# Patient Record
Sex: MALE | Race: WHITE | NOT HISPANIC OR LATINO | Employment: OTHER | URBAN - METROPOLITAN AREA
[De-identification: names, ages, dates, MRNs, and addresses within clinical notes are randomized per-mention and may not be internally consistent; named-entity substitution may affect disease eponyms.]

---

## 2017-02-13 ENCOUNTER — ALLSCRIPTS OFFICE VISIT (OUTPATIENT)
Dept: OTHER | Facility: OTHER | Age: 67
End: 2017-02-13

## 2017-06-09 ENCOUNTER — ALLSCRIPTS OFFICE VISIT (OUTPATIENT)
Dept: OTHER | Facility: OTHER | Age: 67
End: 2017-06-09

## 2017-06-22 ENCOUNTER — GENERIC CONVERSION - ENCOUNTER (OUTPATIENT)
Dept: OTHER | Facility: OTHER | Age: 67
End: 2017-06-22

## 2017-06-22 LAB
A/G RATIO (HISTORICAL): 2.1 (ref 1.2–2.2)
ALBUMIN SERPL BCP-MCNC: 4.4 G/DL (ref 3.6–4.8)
ALP SERPL-CCNC: 69 IU/L (ref 39–117)
ALT SERPL W P-5'-P-CCNC: 14 IU/L (ref 0–44)
AST SERPL W P-5'-P-CCNC: 17 IU/L (ref 0–40)
BILIRUB SERPL-MCNC: 0.3 MG/DL (ref 0–1.2)
BUN SERPL-MCNC: 15 MG/DL (ref 8–27)
BUN/CREA RATIO (HISTORICAL): 18 (ref 10–24)
CALCIUM SERPL-MCNC: 10.3 MG/DL (ref 8.6–10.2)
CHLORIDE SERPL-SCNC: 103 MMOL/L (ref 96–106)
CHOLEST SERPL-MCNC: 182 MG/DL (ref 100–199)
CHOLEST/HDLC SERPL: 2 RATIO UNITS (ref 0–5)
CO2 SERPL-SCNC: 24 MMOL/L (ref 18–29)
CREAT SERPL-MCNC: 0.82 MG/DL (ref 0.76–1.27)
EGFR AFRICAN AMERICAN (HISTORICAL): 106 ML/MIN/1.73
EGFR-AMERICAN CALC (HISTORICAL): 92 ML/MIN/1.73
GLUCOSE SERPL-MCNC: 106 MG/DL (ref 65–99)
HDLC SERPL-MCNC: 90 MG/DL
INTERPRETATION (HISTORICAL): NORMAL
LDLC SERPL CALC-MCNC: 79 MG/DL (ref 0–99)
POTASSIUM SERPL-SCNC: 5.5 MMOL/L (ref 3.5–5.2)
PROSTATE SPECIFIC ANTIGEN (HISTORICAL): 3.2 NG/ML (ref 0–4)
SODIUM SERPL-SCNC: 141 MMOL/L (ref 134–144)
TOT. GLOBULIN, SERUM (HISTORICAL): 2.1 G/DL (ref 1.5–4.5)
TOTAL PROTEIN (HISTORICAL): 6.5 G/DL (ref 6–8.5)
TRIGL SERPL-MCNC: 64 MG/DL (ref 0–149)
VLDLC SERPL CALC-MCNC: 13 MG/DL (ref 5–40)

## 2017-06-23 ENCOUNTER — GENERIC CONVERSION - ENCOUNTER (OUTPATIENT)
Dept: OTHER | Facility: OTHER | Age: 67
End: 2017-06-23

## 2017-07-14 ENCOUNTER — ALLSCRIPTS OFFICE VISIT (OUTPATIENT)
Dept: OTHER | Facility: OTHER | Age: 67
End: 2017-07-14

## 2018-01-05 ENCOUNTER — GENERIC CONVERSION - ENCOUNTER (OUTPATIENT)
Dept: OTHER | Facility: OTHER | Age: 68
End: 2018-01-05

## 2018-01-05 LAB
PROSTATE SPECIFIC ANTIGEN (HISTORICAL): 3.1 NG/ML (ref 0–4)
PROSTATE SPECIFIC ANTIGEN FREE (HISTORICAL): 0.6 NG/ML
PROSTATE SPECIFIC ANTIGEN PERCENT FREE (HISTORICAL): 19.4 %

## 2018-01-08 ENCOUNTER — GENERIC CONVERSION - ENCOUNTER (OUTPATIENT)
Dept: OTHER | Facility: OTHER | Age: 68
End: 2018-01-08

## 2018-01-13 VITALS
BODY MASS INDEX: 25.61 KG/M2 | HEIGHT: 66 IN | WEIGHT: 159.38 LBS | HEART RATE: 90 BPM | DIASTOLIC BLOOD PRESSURE: 88 MMHG | RESPIRATION RATE: 18 BRPM | SYSTOLIC BLOOD PRESSURE: 162 MMHG | TEMPERATURE: 98.2 F

## 2018-01-13 VITALS
BODY MASS INDEX: 24.75 KG/M2 | WEIGHT: 154 LBS | HEART RATE: 100 BPM | DIASTOLIC BLOOD PRESSURE: 86 MMHG | HEIGHT: 66 IN | RESPIRATION RATE: 20 BRPM | SYSTOLIC BLOOD PRESSURE: 138 MMHG | TEMPERATURE: 98 F

## 2018-01-15 VITALS
HEIGHT: 66 IN | HEART RATE: 88 BPM | DIASTOLIC BLOOD PRESSURE: 82 MMHG | TEMPERATURE: 96.5 F | SYSTOLIC BLOOD PRESSURE: 140 MMHG | WEIGHT: 161 LBS | BODY MASS INDEX: 25.88 KG/M2 | RESPIRATION RATE: 20 BRPM

## 2018-01-16 ENCOUNTER — ALLSCRIPTS OFFICE VISIT (OUTPATIENT)
Dept: OTHER | Facility: OTHER | Age: 68
End: 2018-01-16

## 2018-01-16 ENCOUNTER — GENERIC CONVERSION - ENCOUNTER (OUTPATIENT)
Dept: OTHER | Facility: OTHER | Age: 68
End: 2018-01-16

## 2018-01-16 NOTE — RESULT NOTES
Discussion/Summary   PSA borderline  No comparisons  Repeat 6 months  Low carb diet, Hydrate well  Repeat PSA in 6 months     Verified Results  (1) COMPREHENSIVE METABOLIC PANEL [id number] 01:25CO Delmonico, Jodeen Frankel     Test Name Result Flag Reference   Glucose, Serum 106 mg/dL H 65-99   BUN 15 mg/dL  8-27   Creatinine, Serum 0 82 mg/dL  0 76-1 27   BUN/Creatinine Ratio 18  10-24   Sodium, Serum 141 mmol/L  134-144   Potassium, Serum 5 5 mmol/L H 3 5-5 2   Chloride, Serum 103 mmol/L     Carbon Dioxide, Total 24 mmol/L  18-29   Calcium, Serum 10 3 mg/dL H 8 6-10 2   Protein, Total, Serum 6 5 g/dL  6 0-8 5   Albumin, Serum 4 4 g/dL  3 6-4 8   Globulin, Total 2 1 g/dL  1 5-4 5   A/G Ratio 2 1  1 2-2 2   Bilirubin, Total 0 3 mg/dL  0 0-1 2   Alkaline Phosphatase, S 69 IU/L     AST (SGOT) 17 IU/L  0-40   ALT (SGPT) 14 IU/L  0-44   eGFR If NonAfricn Am 92 mL/min/1 73  >59   eGFR If Africn Am 106 mL/min/1 73  >59     (1) LIPID PANEL, FASTING 22Jun2017 08:47AM Austin Hunter Frankel     Test Name Result Flag Reference   Cholesterol, Total 182 mg/dL  100-199   Triglycerides 64 mg/dL  0-149   HDL Cholesterol 90 mg/dL  >39   VLDL Cholesterol Ivan 13 mg/dL  5-40   LDL Cholesterol Calc 79 mg/dL  0-99   T  Chol/HDL Ratio 2 0 ratio units  0 0-5 0   T  Chol/HDL Ratio                                                             Men  Women                                               1/2 Avg  Risk  3 4    3 3                                                   Avg Risk  5 0    4 4                                                2X Avg  Risk  9 6    7 1                                                3X Avg  Risk 23 4   11 0     (1) PSA (SCREEN) (Dx V76 44 Screen for Prostate Cancer) [id number] 08:47AM Austin Hunter Frankel     Test Name Result Flag Reference   Prostate Specific Ag, Serum 3 2 ng/mL  0 0-4 0   Roche ECLIA methodology    According to the American Urological Association, Serum PSA should  decrease and remain at undetectable levels after radical  prostatectomy  The AUA defines biochemical recurrence as an initial  PSA value 0 2 ng/mL or greater followed by a subsequent confirmatory  PSA value 0 2 ng/mL or greater  Values obtained with different assay methods or kits cannot be used  interchangeably  Results cannot be interpreted as absolute evidence  of the presence or absence of malignant disease  [facility]) Cardiovascular Risk Assessment [id number] 08:47AM Gopal Hunter     Test Name Result Flag Reference   Interpretation Note     Supplement report is available  PDF Image

## 2018-01-17 NOTE — PROGRESS NOTES
Assessment   1  Benign hypertension (401 1) (I10)  2  Nicotine abuse (305 1) (Z72 0)  3  Plantar warts (078 12) (B07 0)    Plan   Benign hypertension, Encounter for lipid screening for cardiovascular disease,    Encounter for prostate cancer screening    · (1) COMPREHENSIVE METABOLIC PANEL; Status:Active; Requested for:85Hty2191;    · (1) LIPID PANEL, FASTING; Status:Active; Requested for:89Qga9097;    · (1) PSA FREE & TOTAL; Status:Active; Requested for:01Rhg9818; Benign hypertension, Encounter for prostate cancer screening, Organic impotence    · (1) PSA FREE & TOTAL; Status:Canceled;   Encounter for screening colonoscopy    · COLONOSCOPY; Status:Active; Requested ARE:[id number]; Health Maintenance    · Follow-up Visit in 7 Months Evaluation and Treatment  Follow-up  Status: Hold For -    Scheduling  Requested for: 67JUF6208  Organic impotence    · Start: Sildenafil Citrate 20 MG Oral Tablet; 2 daily as needed    Discussion/Summary      F/u labs PE  Colonoscopy ordered  low salt diet  Smoking cessations  wart patch daily otc eval if persists  The patient was counseled regarding  Chief Complaint   Pt here for b/w review and medication follow up  sp/cma    Patient is here today for follow up of chronic conditions described in HPI  Advance Directives   Advance Directive [facility]:    5 wishes given  History of Present Illness   f/u HTN labs PSA  Needs colon screening  Cough chronic smoker   Pain L heel      Review of Systems        Constitutional: no fever-- and-- no chills  Eyes: no eyesight problems-- and-- no purulent discharge from the eyes  ENT: no nosebleeds-- and-- no nasal discharge  Cardiovascular: the heart rate was not slow,-- no chest pain,-- the heart rate was not fast-- and-- no palpitations  Respiratory: cough, but-- no shortness of breath-- and-- no shortness of breath during exertion  Gastrointestinal: no nausea-- and-- no diarrhea        Genitourinary: no urinary hesitancy-- and-- no nocturia  Integumentary: no itching-- and-- no skin wound  Neurological: no numbness,-- no dizziness-- and-- no fainting  Psychiatric: not suicidal,-- no anxiety,-- no sleep disturbances-- and-- no depression  Endocrine: no muscle weakness  Hematologic/Lymphatic: no swollen glands-- and-- no tendency for easy bleeding  Active Problems   1  Benign hypertension (401 1) (I10)  2  Encounter for lipid screening for cardiovascular disease (V77 91,V81 2) (Z13 220,Z13 6)  3  Encounter for prostate cancer screening (V76 44) (Z12 5)  4  Encounter for screening colonoscopy (V76 51) (Z12 11)  5  Encounter for special screening examination for genitourinary disorder (V81 6) (Z13 89)  6  Nicotine abuse (305 1) (Z72 0)  7  Organic impotence (607 84) (N52 9)  8  Physical exam, annual (V70 0) (Z00 00)  9  Situational anxiety (300 09) (F41 8)  10  Wears glasses (V49 89) (Z97 3)    Past Medical History   1  Inguinal hernia, left (550 90) (K40 90)  2  Inguinal hernia, right (550 90) (K40 90)     The active problems and past medical history were reviewed and updated today  Surgical History   1  History of Hernia Repair Inguinal Bilateral Direct  2  History of Tonsillectomy Over Age 14     The surgical history was reviewed and updated today  Family History   Mother   1  Family history of Alzheimer disease  2  Family history of   Father   3  Family history of Cancer  4  Family history of malignant neoplasm of prostate (V16 42) (Z80 42)  Spouse   5  Family history of Diabetes     The family history was reviewed and updated today  Social History    · Current every day smoker (305 1) (F17 200)   · Daily alcohol use   · Denied: History of Drug use   ·    · Smoker (305 1) (F17 200)   · Two children  The social history was reviewed and updated today  The social history was reviewed and is unchanged  Current Meds   1   Valsartan 40 MG Oral Tablet; TAKE 1 TABLET BY MOUTH ONCE DAILY; Therapy: 71NDQ0853 to (Last Rx:09Jun2017)  Requested for: 84AIL3526 Ordered  2  Viagra 50 MG Oral Tablet; 1 prn; Therapy: 41JHV4192 to (Last Rx:09Jun2017) Ordered     The medication list was reviewed and updated today  Allergies   1  No Known Drug Allergies    Vitals   Vital Signs    Recorded: 08HYY4312 09:43AM Recorded: 34PXN5430 09:33AM   Temperature  97 4 F   Heart Rate  80   Respiration  16   Systolic 192, RUE, Sitting 042   Diastolic 86, RUE, Sitting 90   Weight  165 lb    BMI Calculated  26 63   BSA Calculated  1 84     Physical Exam        Constitutional      General appearance: No acute distress, well appearing and well nourished  Eyes      Conjunctiva and lids: No swelling, erythema, or discharge  Pupils and irises: Equal, round and reactive to light  Ears, Nose, Mouth, and Throat      Nasal mucosa, septum, and turbinates: Normal without edema or erythema  Oropharynx: Normal with no erythema, edema, exudate or lesions  Pulmonary      Respiratory effort: No increased work of breathing or signs of respiratory distress  -- rhonchi  Cardiovascular      Auscultation of heart: Normal rate and rhythm, normal S1 and S2, without murmurs  Examination of extremities for edema and/or varicosities: Normal        Carotid pulses: Normal        Abdomen      Abdomen: Non-tender, no masses  Lymphatic      Palpation of lymph nodes in neck: No lymphadenopathy  Musculoskeletal      Gait and station: Normal        Inspection/palpation of joints, bones, and muscles: Normal        Skin      Skin and subcutaneous tissue: Normal without rashes or lesions  -- wart/callous L heel  Neurologic      Cranial nerves: Cranial nerves 2-12 intact  Reflexes: 2+ and symmetric  Sensation: No sensory loss         Psychiatric      Orientation to person, place and time: Normal        Mood and affect: Normal           Results/Data [facility]) PSA Total+% Free [id number] 08:48AM Delmonico, Dagoberto Primrose      Test Name Result Flag Reference   Prostate Specific Ag, Serum 3 1 ng/mL  0 0-4 0   Roche ECLIA methodology  According to the American Urological Association, Serum PSA should     decrease and remain at undetectable levels after radical     prostatectomy  The AUA defines biochemical recurrence as an initial     PSA value 0 2 ng/mL or greater followed by a subsequent confirmatory     PSA value 0 2 ng/mL or greater  Values obtained with different assay methods or kits cannot be used     interchangeably  Results cannot be interpreted as absolute evidence     of the presence or absence of malignant disease  PSA, Free 0 60 ng/mL  N/A   Roche ECLIA methodology  % Free PSA 19 4 %     The table below lists the probability of prostate cancer for     men with non-suspicious RICHARD results and total PSA between     4 and 10 ng/mL, by patient age Chela , [facility],     [id number])  % Free PSA       50-64 yr        65-75 yr                       0 00-10 00%        56%             55%                      10 01-15 00%        24%             35%                      15 01-20 00%        17%             23%                      20 01-25 00%        10%             20%                           >25 00%         5%              9%     Please note:  Everton et al did not make specific                   recommendations regarding the use of                   percent free PSA for any other population                   of men          Signatures    Electronically signed by : Dimple Jaimes MD; Jan 16 2018 10:00AM EST                       (Author)     Electronically signed by : Dimple Jaimes MD; Jan 16 2018 10:28AM EST                       (Author)

## 2018-01-23 NOTE — RESULT NOTES
Discussion/Summary   PSA stable from prior  review f/u Physical next Prostate exam     Verified Results  [facility]) PSA Total+% Free [id number] 08:48AM Zander Hunter     Test Name Result Flag Reference   Prostate Specific Ag, Serum 3 1 ng/mL  0 0-4 0   Roche ECLIA methodology  According to the American Urological Association, Serum PSA should  decrease and remain at undetectable levels after radical  prostatectomy  The AUA defines biochemical recurrence as an initial  PSA value 0 2 ng/mL or greater followed by a subsequent confirmatory  PSA value 0 2 ng/mL or greater  Values obtained with different assay methods or kits cannot be used  interchangeably  Results cannot be interpreted as absolute evidence  of the presence or absence of malignant disease  PSA, Free 0 60 ng/mL  N/A   Roche ECLIA methodology  % Free PSA 19 4 %     The table below lists the probability of prostate cancer for  men with non-suspicious RICHARD results and total PSA between  4 and 10 ng/mL, by patient age Rachel Segura, [facility],  6:2837)  % Free PSA       50-64 yr        65-75 yr                    0 00-10 00%        56%             55%                   10 01-15 00%        24%             35%                   15 01-20 00%        17%             23%                   20 01-25 00%        10%             20%                        >25 00%         5%              9%  Please note:  Everton et al did not make specific                recommendations regarding the use of                percent free PSA for any other population                of men

## 2018-01-24 VITALS
SYSTOLIC BLOOD PRESSURE: 140 MMHG | RESPIRATION RATE: 16 BRPM | TEMPERATURE: 97.4 F | WEIGHT: 165 LBS | DIASTOLIC BLOOD PRESSURE: 90 MMHG | BODY MASS INDEX: 26.63 KG/M2 | HEART RATE: 80 BPM

## 2018-01-24 VITALS — DIASTOLIC BLOOD PRESSURE: 86 MMHG | SYSTOLIC BLOOD PRESSURE: 138 MMHG

## 2018-01-24 NOTE — PROGRESS NOTES
Assessment   1  Current every day smoker (305 1) (F17 200)  2  Physical exam, annual (V70 0) (Z00 00)  3  Nicotine abuse (305 1) (Z72 0)  4  Benign hypertension (401 1) (I10)  5  Organic impotence (607 84) (N52 9)    Plan  Benign hypertension    · Start: Valsartan 40 MG Oral Tablet; TAKE 1 TABLET BY MOUTH ONCE DAILY   · Follow-up visit in 5 weeks Evaluation and Treatment  Follow-up  Status: Hold For -  Scheduling  Requested for: 99COB2708  Encounter for prostate cancer screening    · (1) COMPREHENSIVE METABOLIC PANEL; Status:Active; Requested XCE:64YJI3427;    · (1) LIPID PANEL, FASTING; Status:Active; Requested QGY:82SVB6316;    · (1) PSA (SCREEN) (Dx V76 44 Screen for Prostate Cancer); Status:Active; Requested  YYA:33GBC7744;   Encounter for screening colonoscopy    · COLONOSCOPY; Status:Active;  Requested PZX:84GME2436;   Organic impotence    · Start: Viagra 50 MG Oral Tablet; 1 prn  Physical exam, annual    · Alcohol misuse can be a problem with advancing age ; Status:Complete;   Done:  35QOZ8212   · Always use a seat belt and shoulder strap when riding or driving a motor vehicle ;  Status:Complete;   Done: 22AIK0423   · Brush your teeth {freq1} and floss at least once a day ; Status:Complete;   Done:  77QZE9683   · Diets that are low in carbohydrates and high in protein are very popular for weight loss ;  Status:Complete;   Done: 84ZLX2650   · Drink plenty of fluids ; Status:Complete;   Done: 60GSW5899   · Regular aerobic exercise can help reduce stress ; Status:Complete;   Done: 95QFI2467   · There are many exercise options for seniors ; Status:Complete;   Done: 40CKI8970   · These are things you can do to prevent falls in and around the home ; Status:Complete;    Done: 23RZJ9083   · We recommend a colonoscopy ; Status:Complete;   Done: 80QVA2308   · We recommend that you create an advance directive ; Status:Complete;   Done:  10ODD1012   · We recommend that you follow these steps to lower your risk of osteoporosis  ;  Status:Complete;   Done: 08QTP0507   · You need to quit smoking ; Status:Complete;   Done: 90FMI2128   · Call [phone] if: You have any warning signs of skin cancer ; Status:Complete;    Done: 84YAX4921   · Call 911 if: You experience a new kind of chest pain (angina) or pressure ;  Status:Complete;   Done: 31CWL7620    Discussion/Summary  Impression: healthy adult male  Prostate cancer screening: PSA was ordered  Testicular cancer screening: the risks and benefits of testicular cancer screening were discussed  Colorectal cancer screening: colonoscopy has been ordered  The risks and benefits of immunizations were discussed  Advice and education were given regarding nutrition and tobacco cessation  F/u labs low salt diet  BP recheck  The patient was counseled regarding  Chief Complaint  pt present for a CPE  pt has not had recent labs  An order for a colon and labs was given today  hg      History of Present Illness  HM, Adult Male: The patient is being seen for a health maintenance evaluation  General Health: The patient's health since the last visit is described as fair  He does not have regular dental visits  He denies hearing loss  Immunizations status: not up to date  Lifestyle:  He does not exercise regularly  He uses tobacco  He consumes alcohol  He denies drug use  Screening: cancer screening reviewed and updated  Additional History:  needs Colon RICHARD Labs  HPI: Physical recent URI , needs lasb Colon, RICHARD      Review of Systems    Constitutional: not feeling poorly and not feeling tired  Eyes: no purulent discharge from the eyes and no itching of the eyes  ENT: no nosebleeds and no hoarseness  Respiratory: cough, but no shortness of breath and no wheezing  Gastrointestinal: no abdominal pain  Genitourinary: no genital lesions  Musculoskeletal: no joint swelling and no joint stiffness  Integumentary: no itching     Neurological: no numbness, no dizziness and no fainting  Psychiatric: no personality change  Endocrine: no muscle weakness  Hematologic/Lymphatic: no swollen glands and no tendency for easy bruising  Active Problems   1  Encounter for lipid screening for cardiovascular disease (V77 91,V81 2) (Z13 220,Z13 6)  2  Encounter for prostate cancer screening (V76 44) (Z12 5)  3  Encounter for screening colonoscopy (V76 51) (Z12 11)  4  Nicotine abuse (305 1) (Z72 0)  5  Situational anxiety (300 09) (F41 8)  6  Wears glasses (V49 89) (Z97 3)    Past Medical History    · Inguinal hernia, left (550 90) (K40 90)   · Inguinal hernia, right (550 90) (K40 90)    Surgical History    · History of Hernia Repair Inguinal Bilateral Direct   · History of Tonsillectomy Over Age 14    Family History  Mother    · Family history of Alzheimer disease   · Family history of   Father    · Family history of Cancer   · Family history of malignant neoplasm of prostate (V16 42) (Z80 42)  Spouse    · Family history of Diabetes    Social History    · Daily alcohol use   · Denied: History of Drug use   ·    · Smoker (305 1) (F17 200)   · Two children    Allergies   1  No Known Drug Allergies    Vitals   Recorded: 00NUL8313 09:20AM Recorded: 20OPG0095 09:01AM   Temperature  98 2 F   Heart Rate  90   Respiration  18   Systolic 023, RUE, Sitting 017   Diastolic 88, RUE, Sitting 110   Height  5 ft 6 in   Weight  159 lb 6 oz   BMI Calculated  25 72   BSA Calculated  1 82     Physical Exam    Constitutional   General appearance: No acute distress, well appearing and well nourished  Head and Face   Head and face: Normal     Eyes glasses  Ears, Nose, Mouth, and Throat   Otoscopic examination: Tympanic membranes translucent with normal light reflex  Canals patent without erythema  Nasal mucosa, septum, and turbinates: Normal without edema or erythema  Oropharynx: Normal with no erythema, edema, exudate or lesions  poor dentition     Neck   Thyroid: Normal, no thyromegaly  Pulmonary scattered ronchi  Cardiovascular   Auscultation of heart: Normal rate and rhythm, normal S1 and S2, no murmurs  Carotid pulses: 2+ bilaterally  Examination of extremities for edema and/or varicosities: Normal     Abdomen   Abdomen: Non-tender, no masses  Genitourinary   Digital rectal exam of prostate: Normal size, no masses  Lymphatic   Palpation of lymph nodes in neck: No lymphadenopathy  Palpation of lymph nodes in other areas: No lymphadenopathy  Musculoskeletal   Gait and station: Normal     Inspection/palpation of digits and nails: Normal without clubbing or cyanosis  Range of motion: Normal     Muscle strength/tone: Normal     Skin   Skin and subcutaneous tissue: Normal without rashes or lesions  seb ker ak  Neurologic   Cranial nerves: Cranial nerves 2-12 intact  Cortical function: Normal mental status  Sensation: No sensory loss      Psychiatric   Judgment and insight: Normal     Orientation to person, place and time: Normal        Procedure    Procedure:   Results:1  20/25 in both eyes with corrective device1 , 20/25 in the right eye with corrective device1 , 20/25 in the left eye with corrective device1    Color vision was and the results were1  normal1         1 Amended By: Otf Ramos; Jun 09 2017 9:34 AM EST    Signatures   Electronically signed by : Kelly Corona MD; Jun 9 2017  9:41AM EST                       (Author)

## 2018-04-20 ENCOUNTER — TELEPHONE (OUTPATIENT)
Dept: FAMILY MEDICINE CLINIC | Facility: CLINIC | Age: 68
End: 2018-04-20

## 2018-04-20 DIAGNOSIS — N52.01 ERECTILE DYSFUNCTION DUE TO ARTERIAL INSUFFICIENCY: Primary | ICD-10-CM

## 2018-04-20 RX ORDER — VALSARTAN 40 MG/1
1 TABLET ORAL DAILY
COMMUNITY
Start: 2017-06-09 | End: 2018-08-28 | Stop reason: RX

## 2018-04-20 RX ORDER — SILDENAFIL 50 MG/1
TABLET, FILM COATED ORAL
COMMUNITY
Start: 2017-06-09 | End: 2018-07-03 | Stop reason: SDUPTHER

## 2018-04-20 RX ORDER — SILDENAFIL CITRATE 20 MG/1
TABLET ORAL
COMMUNITY
Start: 2018-01-16 | End: 2018-04-20 | Stop reason: SDUPTHER

## 2018-04-20 RX ORDER — SILDENAFIL CITRATE 20 MG/1
20 TABLET ORAL AS NEEDED
Qty: 30 TABLET | Refills: 3 | Status: SHIPPED | OUTPATIENT
Start: 2018-04-20 | End: 2019-01-29 | Stop reason: SDUPTHER

## 2018-06-07 ENCOUNTER — TELEPHONE (OUTPATIENT)
Dept: FAMILY MEDICINE CLINIC | Facility: CLINIC | Age: 68
End: 2018-06-07

## 2018-06-07 DIAGNOSIS — I10 ESSENTIAL HYPERTENSION: Primary | ICD-10-CM

## 2018-06-07 PROBLEM — Z72.0 NICOTINE ABUSE: Status: ACTIVE | Noted: 2017-02-13

## 2018-06-07 PROBLEM — F41.8 SITUATIONAL ANXIETY: Status: ACTIVE | Noted: 2017-02-13

## 2018-06-26 LAB
ALBUMIN SERPL-MCNC: 4.3 G/DL (ref 3.6–4.8)
ALBUMIN/GLOB SERPL: 2 {RATIO} (ref 1.2–2.2)
ALP SERPL-CCNC: 71 IU/L (ref 39–117)
ALT SERPL-CCNC: 19 IU/L (ref 0–44)
AST SERPL-CCNC: 23 IU/L (ref 0–40)
BILIRUB SERPL-MCNC: 0.2 MG/DL (ref 0–1.2)
BUN SERPL-MCNC: 21 MG/DL (ref 8–27)
BUN/CREAT SERPL: 23 (ref 10–24)
CALCIUM SERPL-MCNC: 9.9 MG/DL (ref 8.6–10.2)
CHLORIDE SERPL-SCNC: 103 MMOL/L (ref 96–106)
CHOLEST SERPL-MCNC: 160 MG/DL (ref 100–199)
CHOLEST/HDLC SERPL: 2 RATIO (ref 0–5)
CO2 SERPL-SCNC: 23 MMOL/L (ref 20–29)
CREAT SERPL-MCNC: 0.9 MG/DL (ref 0.76–1.27)
GLOBULIN SER-MCNC: 2.2 G/DL (ref 1.5–4.5)
GLUCOSE SERPL-MCNC: 126 MG/DL (ref 65–99)
HDLC SERPL-MCNC: 79 MG/DL
LDLC SERPL CALC-MCNC: 71 MG/DL (ref 0–99)
POTASSIUM SERPL-SCNC: 5.2 MMOL/L (ref 3.5–5.2)
PROT SERPL-MCNC: 6.5 G/DL (ref 6–8.5)
PSA FREE MFR SERPL: 20.3 %
PSA FREE SERPL-MCNC: 0.69 NG/ML
PSA SERPL-MCNC: 3.4 NG/ML (ref 0–4)
SL AMB EGFR AFRICAN AMERICAN: 101 ML/MIN/1.73
SL AMB EGFR NON AFRICAN AMERICAN: 87 ML/MIN/1.73
SL AMB VLDL CHOLESTEROL CALC: 10 MG/DL (ref 5–40)
SODIUM SERPL-SCNC: 139 MMOL/L (ref 134–144)
TRIGL SERPL-MCNC: 48 MG/DL (ref 0–149)

## 2018-07-03 ENCOUNTER — OFFICE VISIT (OUTPATIENT)
Dept: FAMILY MEDICINE CLINIC | Facility: CLINIC | Age: 68
End: 2018-07-03
Payer: COMMERCIAL

## 2018-07-03 VITALS
DIASTOLIC BLOOD PRESSURE: 90 MMHG | TEMPERATURE: 98.3 F | HEART RATE: 68 BPM | SYSTOLIC BLOOD PRESSURE: 129 MMHG | WEIGHT: 160 LBS | RESPIRATION RATE: 12 BRPM | BODY MASS INDEX: 25.82 KG/M2

## 2018-07-03 DIAGNOSIS — I10 BENIGN HYPERTENSION: Primary | ICD-10-CM

## 2018-07-03 DIAGNOSIS — F41.8 SITUATIONAL ANXIETY: ICD-10-CM

## 2018-07-03 DIAGNOSIS — Z72.0 NICOTINE ABUSE: ICD-10-CM

## 2018-07-03 DIAGNOSIS — M25.551 PAIN OF RIGHT HIP JOINT: ICD-10-CM

## 2018-07-03 DIAGNOSIS — R97.20 PSA ELEVATION: ICD-10-CM

## 2018-07-03 PROCEDURE — 1101F PT FALLS ASSESS-DOCD LE1/YR: CPT | Performed by: FAMILY MEDICINE

## 2018-07-03 PROCEDURE — 3725F SCREEN DEPRESSION PERFORMED: CPT | Performed by: FAMILY MEDICINE

## 2018-07-03 PROCEDURE — 1160F RVW MEDS BY RX/DR IN RCRD: CPT | Performed by: FAMILY MEDICINE

## 2018-07-03 PROCEDURE — 99214 OFFICE O/P EST MOD 30 MIN: CPT | Performed by: FAMILY MEDICINE

## 2018-07-03 RX ORDER — METHYLPREDNISOLONE 4 MG/1
TABLET ORAL
Qty: 21 TABLET | Refills: 0 | Status: SHIPPED | OUTPATIENT
Start: 2018-07-03 | End: 2019-03-14 | Stop reason: ALTCHOICE

## 2018-07-03 NOTE — PROGRESS NOTES
Subjective:           Problem List Items Addressed This Visit     Benign hypertension - Primary    Relevant Orders    Comprehensive metabolic panel    Nicotine abuse    Situational anxiety    Pain of right hip joint    Relevant Medications    Methylprednisolone 4 MG TBPK      Other Visit Diagnoses     PSA elevation        Relevant Orders    PSA, total and free              Orders Placed This Encounter   Procedures    PSA, total and free     Standing Status:   Future     Standing Expiration Date:   7/3/2019    Comprehensive metabolic panel     This is a patient instruction: Patient fasting for 8 hours or longer recommended  Standing Status:   Future     Standing Expiration Date:   7/3/2019       Patient Instructions     Recent Results (from the past 1344 hour(s))   Comprehensive metabolic panel    Collection Time: 06/25/18  8:28 AM   Result Value Ref Range    SL AMB GLUCOSE 126 (H) 65 - 99 mg/dL    BUN 21 8 - 27 mg/dL    Creatinine, Serum 0 90 0 76 - 1 27 mg/dL    eGFR Non  87 >59 mL/min/1 73    SL AMB EGFR AFRICAN AMERICAN 101 >59 mL/min/1 73    SL AMB BUN/CREATININE RATIO 23 10 - 24    SL AMB SODIUM 139 134 - 144 mmol/L    SL AMB POTASSIUM 5 2 3 5 - 5 2 mmol/L    SL AMB CHLORIDE 103 96 - 106 mmol/L    SL AMB CARBON DIOXIDE 23 20 - 29 mmol/L    CALCIUM 9 9 8 6 - 10 2 mg/dL    SL AMB PROTEIN, TOTAL 6 5 6 0 - 8 5 g/dL    Serum Albumin 4 3 3 6 - 4 8 g/dL    Globulin, Total 2 2 1 5 - 4 5 g/dL    SL AMB ALBUMIN/GLOBULIN RATIO 2 0 1 2 - 2 2    SL AMB BILIRUBIN, TOTAL 0 2 0 0 - 1 2 mg/dL    Alk Phos Isoenzymes 71 39 - 117 IU/L    SL AMB AST 23 0 - 40 IU/L    SL AMB ALT 19 0 - 44 IU/L   LIPID PANEL WITH CHOL/HDL RATIO    Collection Time: 06/25/18  8:28 AM   Result Value Ref Range    Cholesterol, Total 160 100 - 199 mg/dL    Triglycerides 48 0 - 149 mg/dL    HDL 79 >39 mg/dL    SL AMB VLDL CHOLESTEROL CALC 10 5 - 40 mg/dL    LDL Direct 71 0 - 99 mg/dL    T   Chol/HDL Ratio 2 0 0 0 - 5 0 ratio   PSA, total and free    Collection Time: 06/25/18  8:28 AM   Result Value Ref Range    Prostate Specific Antigen Total 3 4 0 0 - 4 0 ng/mL    PSA, Free 0 69 N/A ng/mL    PSA, Free Pct 20 3 %     Low carb diet  Ortho x-ray if pain stiffness persists  Stay current with colon prostate screening  Smoking cessations  Urology if PSA change  McCaskill Lipps is in for evaluation follow-up hypertension current every day smoker chronic bronchitis, ED  Vitals:    07/03/18 0927   BP: 129/90   Pulse: 68   Resp: 12   Temp: 98 3 °F (36 8 °C)       No Known Allergies    Current Outpatient Prescriptions on File Prior to Visit   Medication Sig Dispense Refill    sildenafil (REVATIO) 20 mg tablet Take 1 tablet (20 mg total) by mouth as needed (ED) 30 tablet 3    valsartan (DIOVAN) 40 mg tablet Take 1 tablet by mouth daily      [DISCONTINUED] sildenafil (VIAGRA) 50 MG tablet Take by mouth       No current facility-administered medications on file prior to visit  Past Medical History:   Diagnosis Date    Inguinal hernia, bilateral     resolved 5/28/15       Past Surgical History:   Procedure Laterality Date    INGUINAL HERNIA REPAIR Bilateral     direct    TONSILLECTOMY      over age 14          reports that he has been smoking  He has never used smokeless tobacco  He reports that he drinks alcohol  He reports that he does not use drugs  reports that he has been smoking  He has never used smokeless tobacco    Past fam soc all reviewed    Review of Systems   Constitutional: Negative for appetite change, diaphoresis and fever  HENT: Negative for congestion, ear pain, postnasal drip, sinus pressure, tinnitus and voice change  Eyes: Negative for discharge and itching  Respiratory: Positive for cough  Negative for chest tightness, shortness of breath and stridor  Cardiovascular: Negative for chest pain and palpitations     Gastrointestinal: Negative for abdominal distention, blood in stool, diarrhea and vomiting  Endocrine: Negative for cold intolerance  Genitourinary: Negative for flank pain, genital sores and testicular pain  Musculoskeletal: Positive for back pain  Negative for arthralgias, joint swelling and myalgias  R post hip   Skin: Negative for rash  Neurological: Negative for dizziness, tremors, speech difficulty and headaches  Hematological: Negative for adenopathy  Psychiatric/Behavioral: Negative for behavioral problems, dysphoric mood, hallucinations and suicidal ideas  Physical Exam   Constitutional: He is oriented to person, place, and time  He appears well-developed and well-nourished  HENT:   Head: Normocephalic and atraumatic  Right Ear: External ear normal    Left Ear: External ear normal    Mouth/Throat: No oropharyngeal exudate  Eyes: Conjunctivae and EOM are normal  Pupils are equal, round, and reactive to light  Right eye exhibits no discharge  Left eye exhibits no discharge  No scleral icterus  Neck: Normal range of motion  Neck supple  No tracheal deviation present  Cardiovascular: Normal rate and regular rhythm  Exam reveals no friction rub  No murmur heard  Pulmonary/Chest: Effort normal and breath sounds normal  No stridor  No respiratory distress  He has no wheezes  He has no rales  Abdominal: Soft  Bowel sounds are normal  He exhibits no distension  There is no rebound and no guarding  No hernia  Musculoskeletal: He exhibits tenderness  He exhibits no edema or deformity  R post Superior SI   Lymphadenopathy:     He has no cervical adenopathy  Neurological: He is alert and oriented to person, place, and time  No cranial nerve deficit  He exhibits normal muscle tone  Coordination normal    Skin: Skin is warm and dry  Capillary refill takes less than 2 seconds  No rash noted  Psychiatric: He has a normal mood and affect   His behavior is normal  Judgment and thought content normal    Nursing note and vitals reviewed

## 2018-07-03 NOTE — PATIENT INSTRUCTIONS
Recent Results (from the past 1344 hour(s))   Comprehensive metabolic panel    Collection Time: 06/25/18  8:28 AM   Result Value Ref Range    SL AMB GLUCOSE 126 (H) 65 - 99 mg/dL    BUN 21 8 - 27 mg/dL    Creatinine, Serum 0 90 0 76 - 1 27 mg/dL    eGFR Non  87 >59 mL/min/1 73    SL AMB EGFR AFRICAN AMERICAN 101 >59 mL/min/1 73    SL AMB BUN/CREATININE RATIO 23 10 - 24    SL AMB SODIUM 139 134 - 144 mmol/L    SL AMB POTASSIUM 5 2 3 5 - 5 2 mmol/L    SL AMB CHLORIDE 103 96 - 106 mmol/L    SL AMB CARBON DIOXIDE 23 20 - 29 mmol/L    CALCIUM 9 9 8 6 - 10 2 mg/dL    SL AMB PROTEIN, TOTAL 6 5 6 0 - 8 5 g/dL    Serum Albumin 4 3 3 6 - 4 8 g/dL    Globulin, Total 2 2 1 5 - 4 5 g/dL    SL AMB ALBUMIN/GLOBULIN RATIO 2 0 1 2 - 2 2    SL AMB BILIRUBIN, TOTAL 0 2 0 0 - 1 2 mg/dL    Alk Phos Isoenzymes 71 39 - 117 IU/L    SL AMB AST 23 0 - 40 IU/L    SL AMB ALT 19 0 - 44 IU/L   LIPID PANEL WITH CHOL/HDL RATIO    Collection Time: 06/25/18  8:28 AM   Result Value Ref Range    Cholesterol, Total 160 100 - 199 mg/dL    Triglycerides 48 0 - 149 mg/dL    HDL 79 >39 mg/dL    SL AMB VLDL CHOLESTEROL CALC 10 5 - 40 mg/dL    LDL Direct 71 0 - 99 mg/dL    T  Chol/HDL Ratio 2 0 0 0 - 5 0 ratio   PSA, total and free    Collection Time: 06/25/18  8:28 AM   Result Value Ref Range    Prostate Specific Antigen Total 3 4 0 0 - 4 0 ng/mL    PSA, Free 0 69 N/A ng/mL    PSA, Free Pct 20 3 %     Low carb diet  Ortho x-ray if pain stiffness persists  Stay current with colon prostate screening  Smoking cessations  Urology if PSA change

## 2018-08-28 ENCOUNTER — TELEPHONE (OUTPATIENT)
Dept: FAMILY MEDICINE CLINIC | Facility: CLINIC | Age: 68
End: 2018-08-28

## 2018-08-28 DIAGNOSIS — I10 ESSENTIAL HYPERTENSION: Primary | ICD-10-CM

## 2018-08-28 RX ORDER — TELMISARTAN 40 MG/1
40 TABLET ORAL DAILY
Qty: 90 TABLET | Refills: 3 | Status: SHIPPED | OUTPATIENT
Start: 2018-08-28 | End: 2019-06-14 | Stop reason: SDUPTHER

## 2019-01-29 DIAGNOSIS — N52.01 ERECTILE DYSFUNCTION DUE TO ARTERIAL INSUFFICIENCY: ICD-10-CM

## 2019-01-29 RX ORDER — SILDENAFIL CITRATE 20 MG/1
TABLET ORAL
Qty: 30 TABLET | Refills: 3 | Status: SHIPPED | OUTPATIENT
Start: 2019-01-29

## 2019-03-02 LAB
ALBUMIN SERPL-MCNC: 4.3 G/DL (ref 3.6–4.8)
ALBUMIN/GLOB SERPL: 2 {RATIO} (ref 1.2–2.2)
ALP SERPL-CCNC: 70 IU/L (ref 39–117)
ALT SERPL-CCNC: 12 IU/L (ref 0–44)
AST SERPL-CCNC: 19 IU/L (ref 0–40)
BILIRUB SERPL-MCNC: 0.2 MG/DL (ref 0–1.2)
BUN SERPL-MCNC: 16 MG/DL (ref 8–27)
BUN/CREAT SERPL: 17 (ref 10–24)
CALCIUM SERPL-MCNC: 10.2 MG/DL (ref 8.6–10.2)
CHLORIDE SERPL-SCNC: 105 MMOL/L (ref 96–106)
CO2 SERPL-SCNC: 24 MMOL/L (ref 20–29)
CREAT SERPL-MCNC: 0.92 MG/DL (ref 0.76–1.27)
GLOBULIN SER-MCNC: 2.2 G/DL (ref 1.5–4.5)
GLUCOSE SERPL-MCNC: 123 MG/DL (ref 65–99)
LABCORP COMMENT: NORMAL
POTASSIUM SERPL-SCNC: 4.7 MMOL/L (ref 3.5–5.2)
PROT SERPL-MCNC: 6.5 G/DL (ref 6–8.5)
PSA FREE MFR SERPL: 16.3 %
PSA FREE SERPL-MCNC: 0.67 NG/ML
PSA SERPL-MCNC: 4.1 NG/ML (ref 0–4)
SL AMB EGFR AFRICAN AMERICAN: 98 ML/MIN/1.73
SL AMB EGFR NON AFRICAN AMERICAN: 85 ML/MIN/1.73
SODIUM SERPL-SCNC: 140 MMOL/L (ref 134–144)

## 2019-03-14 ENCOUNTER — OFFICE VISIT (OUTPATIENT)
Dept: FAMILY MEDICINE CLINIC | Facility: CLINIC | Age: 69
End: 2019-03-14
Payer: COMMERCIAL

## 2019-03-14 VITALS
DIASTOLIC BLOOD PRESSURE: 82 MMHG | OXYGEN SATURATION: 98 % | HEIGHT: 66 IN | TEMPERATURE: 97.3 F | SYSTOLIC BLOOD PRESSURE: 124 MMHG | HEART RATE: 68 BPM | WEIGHT: 160 LBS | RESPIRATION RATE: 16 BRPM | BODY MASS INDEX: 25.71 KG/M2

## 2019-03-14 DIAGNOSIS — N52.8 OTHER MALE ERECTILE DYSFUNCTION: ICD-10-CM

## 2019-03-14 DIAGNOSIS — R73.09 BLOOD GLUCOSE ABNORMAL: ICD-10-CM

## 2019-03-14 DIAGNOSIS — R97.20 PSA ELEVATION: ICD-10-CM

## 2019-03-14 DIAGNOSIS — J41.8 MIXED SIMPLE AND MUCOPURULENT CHRONIC BRONCHITIS (HCC): ICD-10-CM

## 2019-03-14 DIAGNOSIS — I10 BENIGN HYPERTENSION: ICD-10-CM

## 2019-03-14 DIAGNOSIS — Z72.0 NICOTINE ABUSE: Primary | ICD-10-CM

## 2019-03-14 PROCEDURE — 3008F BODY MASS INDEX DOCD: CPT | Performed by: FAMILY MEDICINE

## 2019-03-14 PROCEDURE — 1160F RVW MEDS BY RX/DR IN RCRD: CPT | Performed by: FAMILY MEDICINE

## 2019-03-14 PROCEDURE — 3074F SYST BP LT 130 MM HG: CPT | Performed by: FAMILY MEDICINE

## 2019-03-14 PROCEDURE — 99214 OFFICE O/P EST MOD 30 MIN: CPT | Performed by: FAMILY MEDICINE

## 2019-03-14 PROCEDURE — 3079F DIAST BP 80-89 MM HG: CPT | Performed by: FAMILY MEDICINE

## 2019-03-14 NOTE — PATIENT INSTRUCTIONS
Follow up PSA Urology if > 4 0   Cont medications     Low carb diet  Stay current with colon/prostate screening , vaccinations   Nicotine cessation

## 2019-03-14 NOTE — PROGRESS NOTES
Subjective:           Problem List Items Addressed This Visit        Respiratory    Mixed simple and mucopurulent chronic bronchitis ([address] ) unchanged  mucolytics smoking cessation X-ray CT screening        Cardiovascular and Mediastinum    Benign hypertension stable cont medications       Other    Nicotine abuse - Primary smoking cessation discussed    PSA elevation f/u Urology if PSA >4 0    Relevant Orders    PSA, total and free    Other male erectile dysfunction      Other Visit Diagnoses     Blood glucose abnormal     Low carb diet  Orders Placed This Encounter   Procedures    PSA, total and free     Standing Status:   Future     Standing Expiration Date:   3/14/2020       Patient Instructions   Follow up PSA Urology if > 4 0   Cont medications     Low carb diet  Stay current with colon/prostate screening , vaccinations  Nicotine cessation      Arianna Dus    Chief Complaint   Patient presents with    Follow-up      6 month  follow up on blood pressure  af/rma     HPI Davin Mckeon is in for follow-up hypertension smoking history asthmatic bronchitis ED, history of elevation PSA, advise urologic evaluation last PSA 4 1  % free 20 3    /82 (BP Location: Right arm, Patient Position: Sitting, Cuff Size: Large)   Pulse 68   Temp (!) 97 3 °F (36 3 °C) (Tympanic)   Resp 16   Ht 5' 6" (1 676 m)   Wt 72 6 kg (160 lb)   SpO2 98%   BMI 25 82 kg/m²       No Known Allergies    Current Outpatient Medications on File Prior to Visit   Medication Sig Dispense Refill    sildenafil (REVATIO) 20 mg tablet TAKE AS DIRECTED AS NEEDED FOR ERECTILE DYSFUNCTION 30 tablet 3    telmisartan (MICARDIS) 40 mg tablet Take 1 tablet (40 mg total) by mouth daily 90 tablet 3    [DISCONTINUED] Methylprednisolone 4 MG TBPK Use as directed on package 21 tablet 0     No current facility-administered medications on file prior to visit          Past Medical History:   Diagnosis Date    Inguinal hernia, bilateral resolved 5/28/15       Past Surgical History:   Procedure Laterality Date    INGUINAL HERNIA REPAIR Bilateral     direct    TONSILLECTOMY      over age 14          reports that he has been smoking  He has never used smokeless tobacco  He reports that he drinks alcohol  He reports that he does not use drugs  reports that he has been smoking  He has never used smokeless tobacco         Review of Systems   Constitutional: Negative for appetite change, diaphoresis and fever  HENT: Negative for congestion, ear pain, postnasal drip, sinus pressure, tinnitus and voice change  Eyes: Negative for discharge and itching  Respiratory: Positive for cough  Negative for chest tightness, shortness of breath and stridor  Cardiovascular: Negative for chest pain and palpitations  Gastrointestinal: Negative for abdominal distention, blood in stool, diarrhea and vomiting  Endocrine: Negative for cold intolerance  Genitourinary: Negative for flank pain, genital sores and testicular pain  Musculoskeletal: Positive for back pain  Negative for arthralgias, joint swelling, myalgias and neck stiffness  R post hip   Skin: Negative for pallor and rash  Neurological: Negative for dizziness, tremors, speech difficulty and headaches  Hematological: Negative for adenopathy  Psychiatric/Behavioral: Negative for behavioral problems, dysphoric mood, hallucinations, self-injury and suicidal ideas  The patient is not nervous/anxious  Physical Exam   Constitutional: He is oriented to person, place, and time  He appears well-developed and well-nourished  HENT:   Head: Normocephalic and atraumatic  Right Ear: External ear normal    Left Ear: External ear normal    Mouth/Throat: No oropharyngeal exudate  Eyes: Pupils are equal, round, and reactive to light  Conjunctivae and EOM are normal  Right eye exhibits no discharge  Left eye exhibits no discharge  No scleral icterus  Neck: Normal range of motion  Neck supple  No tracheal deviation present  Cardiovascular: Normal rate and regular rhythm  Exam reveals no friction rub  No murmur heard  Pulses:       Dorsalis pedis pulses are 1+ on the right side, and 1+ on the left side  Pulmonary/Chest: Effort normal and breath sounds normal  No stridor  No respiratory distress  He has no wheezes  He has no rales  Rhonchi clears with cough     decreased BS   Abdominal: Soft  Bowel sounds are normal  He exhibits no distension  There is no rebound and no guarding  No hernia  Musculoskeletal: He exhibits no edema, tenderness or deformity  Feet:   Right Foot:   Skin Integrity: Negative for ulcer, skin breakdown, erythema, warmth, callus or dry skin  Left Foot:   Skin Integrity: Negative for ulcer, skin breakdown, erythema, warmth, callus or dry skin  Lymphadenopathy:     He has no cervical adenopathy  Neurological: He is alert and oriented to person, place, and time  No cranial nerve deficit  He exhibits normal muscle tone  Coordination normal    Skin: Skin is warm and dry  Capillary refill takes less than 2 seconds  No rash noted  Psychiatric: He has a normal mood and affect  His behavior is normal  Judgment and thought content normal    Nursing note and vitals reviewed  Right Foot/Ankle   Right Foot Inspection  Skin Exam: skin normal and skin intact no dry skin, no warmth, no callus, no erythema, no maceration, no abnormal color, no pre-ulcer, no ulcer and no callus                          Toe Exam: ROM and strength within normal limitsno swelling, no tenderness, erythema and  no right toe deformity  Sensory   Vibration: intact  Proprioception: intact   Monofilament testing: intact  Vascular  Capillary refills: < 3 seconds  The right DP pulse is 1+       Left Foot/Ankle  Left Foot Inspection  Skin Exam: skin normal and skin intactno dry skin, no warmth, no erythema, no maceration, normal color, no pre-ulcer, no ulcer and no callus Toe Exam: ROM and strength within normal limitsno swelling, no tenderness, no erythema and no left toe deformity                   Sensory   Vibration: intact  Proprioception: intact  Monofilament: intact  Vascular  Capillary refills: < 3 seconds  The left DP pulse is 1+  Assign Risk Category:  No deformity present;  No loss of protective sensation;        Risk: 0

## 2019-04-30 ENCOUNTER — TELEPHONE (OUTPATIENT)
Dept: FAMILY MEDICINE CLINIC | Facility: CLINIC | Age: 69
End: 2019-04-30

## 2019-04-30 DIAGNOSIS — B07.0 PLANTAR WART: Primary | ICD-10-CM

## 2019-05-08 ENCOUNTER — OFFICE VISIT (OUTPATIENT)
Dept: PODIATRY | Facility: CLINIC | Age: 69
End: 2019-05-08
Payer: COMMERCIAL

## 2019-05-08 VITALS
RESPIRATION RATE: 17 BRPM | BODY MASS INDEX: 25.71 KG/M2 | WEIGHT: 160 LBS | HEART RATE: 68 BPM | SYSTOLIC BLOOD PRESSURE: 124 MMHG | DIASTOLIC BLOOD PRESSURE: 82 MMHG | HEIGHT: 66 IN

## 2019-05-08 DIAGNOSIS — M72.2 PLANTAR FASCIITIS: Primary | ICD-10-CM

## 2019-05-08 DIAGNOSIS — Q66.51 CONGENITAL PES PLANUS OF RIGHT FOOT: ICD-10-CM

## 2019-05-08 DIAGNOSIS — B07.0 PLANTAR WARTS: ICD-10-CM

## 2019-05-08 DIAGNOSIS — Q66.52 CONGENITAL PES PLANUS OF LEFT FOOT: ICD-10-CM

## 2019-05-08 DIAGNOSIS — M79.672 LEFT FOOT PAIN: ICD-10-CM

## 2019-05-08 PROCEDURE — 99203 OFFICE O/P NEW LOW 30 MIN: CPT | Performed by: PODIATRIST

## 2019-05-08 PROCEDURE — 17110 DESTRUCTION B9 LES UP TO 14: CPT | Performed by: PODIATRIST

## 2019-05-23 ENCOUNTER — OFFICE VISIT (OUTPATIENT)
Dept: PODIATRY | Facility: CLINIC | Age: 69
End: 2019-05-23
Payer: COMMERCIAL

## 2019-05-23 VITALS
SYSTOLIC BLOOD PRESSURE: 124 MMHG | BODY MASS INDEX: 25.71 KG/M2 | DIASTOLIC BLOOD PRESSURE: 82 MMHG | RESPIRATION RATE: 16 BRPM | HEIGHT: 66 IN | WEIGHT: 160 LBS | HEART RATE: 68 BPM

## 2019-05-23 DIAGNOSIS — B07.0 PLANTAR WARTS: Primary | ICD-10-CM

## 2019-05-23 DIAGNOSIS — M79.672 LEFT FOOT PAIN: ICD-10-CM

## 2019-05-23 PROCEDURE — 17110 DESTRUCTION B9 LES UP TO 14: CPT | Performed by: PODIATRIST

## 2019-06-11 LAB
PSA FREE MFR SERPL: 17.4 %
PSA FREE SERPL-MCNC: 0.73 NG/ML
PSA SERPL-MCNC: 4.2 NG/ML (ref 0–4)

## 2019-06-14 DIAGNOSIS — I10 ESSENTIAL HYPERTENSION: ICD-10-CM

## 2019-06-17 RX ORDER — TELMISARTAN 40 MG/1
40 TABLET ORAL DAILY
Qty: 90 TABLET | Refills: 3 | Status: SHIPPED | OUTPATIENT
Start: 2019-06-17 | End: 2019-10-02 | Stop reason: SDUPTHER

## 2019-10-02 DIAGNOSIS — I10 ESSENTIAL HYPERTENSION: ICD-10-CM

## 2019-10-02 RX ORDER — TELMISARTAN 40 MG/1
40 TABLET ORAL DAILY
Qty: 90 TABLET | Refills: 3 | Status: SHIPPED | OUTPATIENT
Start: 2019-10-02

## 2019-10-02 NOTE — TELEPHONE ENCOUNTER
Verified with pharmacy and they do not have any refills on this medication even though when you sent it back in June you sent it with 3 refills     Carroll Norris MA

## 2019-10-02 NOTE — TELEPHONE ENCOUNTER
Patient advised rx sent to [address] [place] [address] #90 with 3 refills  No further action needed    Janie Obrien MA

## 2020-05-07 ENCOUNTER — TELEPHONE (OUTPATIENT)
Dept: ENDOCRINOLOGY | Facility: CLINIC | Age: 70
End: 2020-05-07

## 2020-12-02 ENCOUNTER — VBI (OUTPATIENT)
Dept: ADMINISTRATIVE | Facility: OTHER | Age: 70
End: 2020-12-02

## 2021-12-01 ENCOUNTER — TELEPHONE (OUTPATIENT)
Dept: FAMILY MEDICINE CLINIC | Facility: CLINIC | Age: 71
End: 2021-12-01

## 2021-12-09 ENCOUNTER — VBI (OUTPATIENT)
Dept: ADMINISTRATIVE | Facility: OTHER | Age: 71
End: 2021-12-09

## 2022-08-30 ENCOUNTER — TELEPHONE (OUTPATIENT)
Dept: FAMILY MEDICINE CLINIC | Facility: CLINIC | Age: 72
End: 2022-08-30

## 2022-09-01 NOTE — TELEPHONE ENCOUNTER
[facility]- Please remove Dr Omar Israel as PCP  Patient has not been  seen in office for 3+ years  3 attempts to schedule patient have been unsuccessful   Mailed certified letter of attribution today

## 2022-09-30 NOTE — TELEPHONE ENCOUNTER
09/30/22 2:12 PM     Thank you for your request  Your request has been received, reviewed, and the patient chart updated  The PCP has successfully been removed with a patient attribution note  This message will now be completed      Thank you  Marlena Jean Baptiste

## 2023-04-05 ENCOUNTER — OFFICE VISIT (OUTPATIENT)
Dept: GASTROENTEROLOGY | Facility: CLINIC | Age: 73
End: 2023-04-05

## 2023-04-05 ENCOUNTER — TELEPHONE (OUTPATIENT)
Dept: GASTROENTEROLOGY | Facility: CLINIC | Age: 73
End: 2023-04-05

## 2023-04-05 VITALS
DIASTOLIC BLOOD PRESSURE: 95 MMHG | HEIGHT: 67 IN | BODY MASS INDEX: 26.63 KG/M2 | SYSTOLIC BLOOD PRESSURE: 177 MMHG | WEIGHT: 169.7 LBS | HEART RATE: 80 BPM

## 2023-04-05 DIAGNOSIS — K59.00 CONSTIPATION, UNSPECIFIED CONSTIPATION TYPE: ICD-10-CM

## 2023-04-05 DIAGNOSIS — Z12.11 COLON CANCER SCREENING: Primary | ICD-10-CM

## 2023-04-05 RX ORDER — POLYETHYLENE GLYCOL 3350, SODIUM CHLORIDE, SODIUM BICARBONATE, POTASSIUM CHLORIDE 420; 11.2; 5.72; 1.48 G/4L; G/4L; G/4L; G/4L
4000 POWDER, FOR SOLUTION ORAL ONCE
Qty: 4000 ML | Refills: 0 | Status: SHIPPED | OUTPATIENT
Start: 2023-04-05 | End: 2023-04-05

## 2023-04-05 NOTE — TELEPHONE ENCOUNTER
Scheduled date of colonoscopy (as of today):04/20/23  Physician performing colonoscopy:Dr Tatum Del Angel  Location of colonoscopy:[facility]  Bowel prep reviewed with patient:Alfredito/greg  Instructions reviewed with patient by:ti  Clearances: n/a

## 2023-04-05 NOTE — PROGRESS NOTES
[facility] - Outpatient Consultation  Lucero Zabala 67 y o  male MRN: [id number]  Encounter: [id number]        ASSESSMENT AND PLAN:       Diagnoses and all orders for this visit:    Colon cancer screening  We discussed colonoscopy in detail       -     Colonoscopy; Future  -     polyethylene glycol-electrolytes (TriLyte) 4000 mL solution; Take 4,000 mL by mouth once for 1 dose Take 4000 mL by mouth once for 1 dose  Use as directed    Constipation  Discussed high-fiber diet and he was provided literature regarding this  Will evaluate at the time of colonoscopy      ______________________________________________________________________    HPI: Patient presents to discuss arranging a colonoscopy  He has not done this previously and does not recall if he has had prior colorectal cancer screening  He has fairly chronic constipation though feels that this is somewhat worse in recent months  No blood in his stool, no abdominal pain, no unexplained weight loss  No family history of gastrointestinal disease  Patient smokes cigarettes      REVIEW OF SYSTEMS:    Review of Systems   Constitutional: Negative for chills, fever and weight loss  Gastrointestinal: Negative for abdominal pain, diarrhea, heartburn, nausea and vomiting          Historical Information   Past Medical History:   Diagnosis Date   • Inguinal hernia, bilateral     resolved 5/28/15     Past Surgical History:   Procedure Laterality Date   • INGUINAL HERNIA REPAIR Bilateral     direct   • TONSILLECTOMY      over age 14     Social History   Social History     Substance and Sexual Activity   Alcohol Use Yes    Comment: daily use     Social History     Substance and Sexual Activity   Drug Use Yes   • Types: Marijuana     Social History     Tobacco Use   Smoking Status Every Day   • Types: Cigars   Smokeless Tobacco Never   Tobacco Comments    smoker     Family History   Problem Relation Age of Onset   • Alzheimer's disease "Mother    • Cancer Father    • Prostate cancer Father    • Diabetes Family        Meds/Allergies       Current Outpatient Medications:   •  polyethylene glycol-electrolytes (TriLyte) 4000 mL solution  •  sildenafil (REVATIO) 20 mg tablet  •  telmisartan (MICARDIS) 40 mg tablet    No Known Allergies        Objective     Blood pressure (!) 177/95, pulse 80, height 5' 7\" (1 702 m), weight 77 kg (169 lb 11 2 oz)  Body mass index is 26 58 kg/m²  PHYSICAL EXAM:      Physical Exam         Lab Results:   No visits with results within 1 Day(s) from this visit  Latest known visit with results is:   Orders Only on 06/10/2019   Component Date Value   • Prostate Specific Antige* 06/10/2019 4 2 (H)    • PSA, Free 06/10/2019 0 73    • PSA, Free Pct 06/10/2019 17 4          Radiology Results:   No results found    "

## 2023-04-05 NOTE — H&P (VIEW-ONLY)
[facility] - Outpatient Consultation  Mario Byers 67 y o  male MRN: [id number]  Encounter: [id number]        ASSESSMENT AND PLAN:       Diagnoses and all orders for this visit:    Colon cancer screening  We discussed colonoscopy in detail       -     Colonoscopy; Future  -     polyethylene glycol-electrolytes (TriLyte) 4000 mL solution; Take 4,000 mL by mouth once for 1 dose Take 4000 mL by mouth once for 1 dose  Use as directed    Constipation  Discussed high-fiber diet and he was provided literature regarding this  Will evaluate at the time of colonoscopy      ______________________________________________________________________    HPI: Patient presents to discuss arranging a colonoscopy  He has not done this previously and does not recall if he has had prior colorectal cancer screening  He has fairly chronic constipation though feels that this is somewhat worse in recent months  No blood in his stool, no abdominal pain, no unexplained weight loss  No family history of gastrointestinal disease  Patient smokes cigarettes      REVIEW OF SYSTEMS:    Review of Systems   Constitutional: Negative for chills, fever and weight loss  Gastrointestinal: Negative for abdominal pain, diarrhea, heartburn, nausea and vomiting          Historical Information   Past Medical History:   Diagnosis Date   • Inguinal hernia, bilateral     resolved 5/28/15     Past Surgical History:   Procedure Laterality Date   • INGUINAL HERNIA REPAIR Bilateral     direct   • TONSILLECTOMY      over age 14     Social History   Social History     Substance and Sexual Activity   Alcohol Use Yes    Comment: daily use     Social History     Substance and Sexual Activity   Drug Use Yes   • Types: Marijuana     Social History     Tobacco Use   Smoking Status Every Day   • Types: Cigars   Smokeless Tobacco Never   Tobacco Comments    smoker     Family History   Problem Relation Age of Onset   • Alzheimer's disease "Mother    • Cancer Father    • Prostate cancer Father    • Diabetes Family        Meds/Allergies       Current Outpatient Medications:   •  polyethylene glycol-electrolytes (TriLyte) 4000 mL solution  •  sildenafil (REVATIO) 20 mg tablet  •  telmisartan (MICARDIS) 40 mg tablet    No Known Allergies        Objective     Blood pressure (!) 177/95, pulse 80, height 5' 7\" (1 702 m), weight 77 kg (169 lb 11 2 oz)  Body mass index is 26 58 kg/m²  PHYSICAL EXAM:      Physical Exam         Lab Results:   No visits with results within 1 Day(s) from this visit  Latest known visit with results is:   Orders Only on 06/10/2019   Component Date Value   • Prostate Specific Antige* 06/10/2019 4 2 (H)    • PSA, Free 06/10/2019 0 73    • PSA, Free Pct 06/10/2019 17 4          Radiology Results:   No results found    "

## 2023-04-06 ENCOUNTER — TELEPHONE (OUTPATIENT)
Dept: GASTROENTEROLOGY | Facility: CLINIC | Age: 73
End: 2023-04-06

## 2023-04-06 NOTE — TELEPHONE ENCOUNTER
lmom confirming pt's colonoscopy scheduled on 4/20/23 at [facility] with Dr Lisette Silva  Informed [facility] would be calling the day prior with the arrival time  Informed of clear liquid diet day prior as well as the bowel cleansing preparation  Informed would need a  the day of the procedure due to being under sedation  I asked pt to please call back if has not received instructions or if has any questions

## 2023-04-19 RX ORDER — SODIUM CHLORIDE, SODIUM LACTATE, POTASSIUM CHLORIDE, CALCIUM CHLORIDE 600; 310; 30; 20 MG/100ML; MG/100ML; MG/100ML; MG/100ML
50 INJECTION, SOLUTION INTRAVENOUS CONTINUOUS
Status: CANCELLED | OUTPATIENT
Start: 2023-04-19

## 2023-04-19 RX ORDER — LIDOCAINE HYDROCHLORIDE 10 MG/ML
0.5 INJECTION, SOLUTION EPIDURAL; INFILTRATION; INTRACAUDAL; PERINEURAL ONCE AS NEEDED
Status: CANCELLED | OUTPATIENT
Start: 2023-04-19

## 2023-04-20 ENCOUNTER — HOSPITAL ENCOUNTER (OUTPATIENT)
Dept: GASTROENTEROLOGY | Facility: AMBULARY SURGERY CENTER | Age: 73
Setting detail: OUTPATIENT SURGERY
Discharge: HOME | End: 2023-04-20
Attending: INTERNAL MEDICINE

## 2023-04-20 VITALS
OXYGEN SATURATION: 98 % | HEART RATE: 78 BPM | BODY MASS INDEX: 25.06 KG/M2 | DIASTOLIC BLOOD PRESSURE: 84 MMHG | TEMPERATURE: 97.9 F | RESPIRATION RATE: 20 BRPM | WEIGHT: 160 LBS | SYSTOLIC BLOOD PRESSURE: 170 MMHG

## 2023-04-20 DIAGNOSIS — Z12.11 COLON CANCER SCREENING: ICD-10-CM

## 2023-04-20 RX ORDER — LIDOCAINE HYDROCHLORIDE 10 MG/ML
0.5 INJECTION, SOLUTION EPIDURAL; INFILTRATION; INTRACAUDAL; PERINEURAL ONCE AS NEEDED
Status: DISCONTINUED | OUTPATIENT
Start: 2023-04-20 | End: 2023-04-24 | Stop reason: HOSPADM

## 2023-04-20 RX ORDER — SODIUM CHLORIDE, SODIUM LACTATE, POTASSIUM CHLORIDE, CALCIUM CHLORIDE 600; 310; 30; 20 MG/100ML; MG/100ML; MG/100ML; MG/100ML
50 INJECTION, SOLUTION INTRAVENOUS CONTINUOUS
Status: DISCONTINUED | OUTPATIENT
Start: 2023-04-20 | End: 2023-04-24 | Stop reason: HOSPADM

## 2023-04-20 NOTE — INTERVAL H&P NOTE
H&P reviewed  After examining the patient I find no changes in the patients condition since the H&P had been written      Vitals:    04/20/23 0801   BP: 167/95   Pulse: 80   Resp: 20   Temp: 97 9 °F (36 6 °C)   SpO2: 95%

## 2023-05-12 ENCOUNTER — TELEPHONE (OUTPATIENT)
Dept: GASTROENTEROLOGY | Facility: CLINIC | Age: 73
End: 2023-05-12

## 2023-05-12 NOTE — TELEPHONE ENCOUNTER
Patients GI provider:  Dr Walls     Number to return call: [phone]    Reason for call: Pt would like a call with the results of his colonoscopy pathology    Scheduled procedure/appointment date if applicable: Apt/procedure 4/20/23

## 2023-05-17 ENCOUNTER — TELEPHONE (OUTPATIENT)
Dept: GASTROENTEROLOGY | Facility: CLINIC | Age: 73
End: 2023-05-17

## 2023-05-17 NOTE — TELEPHONE ENCOUNTER
----- Message from Jude Antoine LPN sent at 1/12/0844 10:56 AM EDT -----  Patient aware and educated  Please call patient to schedule repeat colon 3 months- large polyp   Thank you

## 2023-06-08 ENCOUNTER — PREP FOR PROCEDURE (OUTPATIENT)
Dept: GASTROENTEROLOGY | Facility: CLINIC | Age: 73
End: 2023-06-08

## 2023-06-08 DIAGNOSIS — Z86.010 HISTORY OF COLON POLYPS: Primary | ICD-10-CM

## 2023-07-31 ENCOUNTER — TELEPHONE (OUTPATIENT)
Dept: GASTROENTEROLOGY | Facility: CLINIC | Age: 73
End: 2023-07-31

## 2023-07-31 DIAGNOSIS — Z12.11 COLON CANCER SCREENING: ICD-10-CM

## 2023-07-31 NOTE — TELEPHONE ENCOUNTER
Left message reminding pt of his colonoscopy on 8/17. He will need a , will be called day prior with time and if he doesn't have instructions for the Trilyte, please call and also if he has any questions.

## 2023-08-01 RX ORDER — POLYETHYLENE GLYCOL 3350, SODIUM CHLORIDE, SODIUM BICARBONATE, POTASSIUM CHLORIDE 420; 11.2; 5.72; 1.48 G/4L; G/4L; G/4L; G/4L
POWDER, FOR SOLUTION ORAL
Qty: 4000 ML | Refills: 0 | Status: SHIPPED | OUTPATIENT
Start: 2023-08-01

## 2023-08-17 ENCOUNTER — HOSPITAL ENCOUNTER (OUTPATIENT)
Dept: GASTROENTEROLOGY | Facility: AMBULARY SURGERY CENTER | Age: 73
Setting detail: OUTPATIENT SURGERY
Discharge: HOME | End: 2023-08-17
Attending: INTERNAL MEDICINE
Payer: COMMERCIAL

## 2023-08-17 ENCOUNTER — ANESTHESIA EVENT (OUTPATIENT)
Dept: GASTROENTEROLOGY | Facility: AMBULARY SURGERY CENTER | Age: 73
End: 2023-08-17

## 2023-08-17 ENCOUNTER — ANESTHESIA (OUTPATIENT)
Dept: GASTROENTEROLOGY | Facility: AMBULARY SURGERY CENTER | Age: 73
End: 2023-08-17

## 2023-08-17 VITALS
HEART RATE: 56 BPM | DIASTOLIC BLOOD PRESSURE: 81 MMHG | SYSTOLIC BLOOD PRESSURE: 165 MMHG | RESPIRATION RATE: 18 BRPM | TEMPERATURE: 97.5 F | OXYGEN SATURATION: 98 %

## 2023-08-17 DIAGNOSIS — Z86.010 HISTORY OF COLON POLYPS: ICD-10-CM

## 2023-08-17 PROCEDURE — 88305 TISSUE EXAM BY PATHOLOGIST: CPT | Performed by: PATHOLOGY

## 2023-08-17 PROCEDURE — 45385 COLONOSCOPY W/LESION REMOVAL: CPT | Performed by: INTERNAL MEDICINE

## 2023-08-17 RX ORDER — LIDOCAINE HYDROCHLORIDE 10 MG/ML
INJECTION, SOLUTION EPIDURAL; INFILTRATION; INTRACAUDAL; PERINEURAL AS NEEDED
Status: DISCONTINUED | OUTPATIENT
Start: 2023-08-17 | End: 2023-08-17

## 2023-08-17 RX ORDER — LIDOCAINE HYDROCHLORIDE 10 MG/ML
0.5 INJECTION, SOLUTION EPIDURAL; INFILTRATION; INTRACAUDAL; PERINEURAL ONCE AS NEEDED
Status: DISCONTINUED | OUTPATIENT
Start: 2023-08-17 | End: 2023-08-21 | Stop reason: HOSPADM

## 2023-08-17 RX ORDER — PROPOFOL 10 MG/ML
INJECTION, EMULSION INTRAVENOUS CONTINUOUS PRN
Status: DISCONTINUED | OUTPATIENT
Start: 2023-08-17 | End: 2023-08-17

## 2023-08-17 RX ORDER — SODIUM CHLORIDE, SODIUM LACTATE, POTASSIUM CHLORIDE, CALCIUM CHLORIDE 600; 310; 30; 20 MG/100ML; MG/100ML; MG/100ML; MG/100ML
50 INJECTION, SOLUTION INTRAVENOUS CONTINUOUS
Status: DISCONTINUED | OUTPATIENT
Start: 2023-08-17 | End: 2023-08-21 | Stop reason: HOSPADM

## 2023-08-17 RX ORDER — SODIUM CHLORIDE, SODIUM LACTATE, POTASSIUM CHLORIDE, CALCIUM CHLORIDE 600; 310; 30; 20 MG/100ML; MG/100ML; MG/100ML; MG/100ML
50 INJECTION, SOLUTION INTRAVENOUS CONTINUOUS
Status: CANCELLED | OUTPATIENT
Start: 2023-08-17

## 2023-08-17 RX ORDER — LIDOCAINE HYDROCHLORIDE 10 MG/ML
0.5 INJECTION, SOLUTION EPIDURAL; INFILTRATION; INTRACAUDAL; PERINEURAL ONCE AS NEEDED
Status: CANCELLED | OUTPATIENT
Start: 2023-08-17

## 2023-08-17 RX ORDER — PROPOFOL 10 MG/ML
INJECTION, EMULSION INTRAVENOUS AS NEEDED
Status: DISCONTINUED | OUTPATIENT
Start: 2023-08-17 | End: 2023-08-17

## 2023-08-17 RX ADMIN — PROPOFOL 70 MG: 10 INJECTION, EMULSION INTRAVENOUS at 07:32

## 2023-08-17 RX ADMIN — PROPOFOL 100 MCG/KG/MIN: 10 INJECTION, EMULSION INTRAVENOUS at 07:32

## 2023-08-17 RX ADMIN — SODIUM CHLORIDE, SODIUM LACTATE, POTASSIUM CHLORIDE, AND CALCIUM CHLORIDE: .6; .31; .03; .02 INJECTION, SOLUTION INTRAVENOUS at 07:07

## 2023-08-17 RX ADMIN — LIDOCAINE HYDROCHLORIDE 5 ML: 10 INJECTION, SOLUTION EPIDURAL; INFILTRATION; INTRACAUDAL; PERINEURAL at 07:32

## 2023-08-17 RX ADMIN — PROPOFOL 30 MG: 10 INJECTION, EMULSION INTRAVENOUS at 07:37

## 2023-08-17 NOTE — H&P
History and Physical -  [facility]  Thomas Shin 67 y.o. male MRN: [id number]                  HPI: Thomas Shin is a 67y.o. year old male who presents for surveillance of large adenoma April 2023 partially removed      REVIEW OF SYSTEMS: Per the HPI, and otherwise unremarkable. Historical Information   Past Medical History:   Diagnosis Date   • ED (erectile dysfunction)    • Hypertension    • Inguinal hernia, bilateral     resolved 5/28/15   • Shortness of breath     exertional     Past Surgical History:   Procedure Laterality Date   • INGUINAL HERNIA REPAIR Bilateral     direct   • TONSILLECTOMY      over age 14     Social History   Social History     Substance and Sexual Activity   Alcohol Use Yes    Comment: daily use     Social History     Substance and Sexual Activity   Drug Use Yes   • Types: Marijuana    Comment: occ     Social History     Tobacco Use   Smoking Status Every Day   • Types: Cigars   • Passive exposure: Past   Smokeless Tobacco Never   Tobacco Comments    smoker     Family History   Problem Relation Age of Onset   • Alzheimer's disease Mother    • Cancer Father         Prostate   • Prostate cancer Father    • Diabetes Family        Meds/Allergies       Current Outpatient Medications:   •  polyethylene glycol-electrolytes (NULYTELY) 4000 mL solution    Current Facility-Administered Medications:   •  lactated ringers infusion, 50 mL/hr, Intravenous, Continuous, New Bag at 08/17/23 0707  •  lidocaine (PF) (XYLOCAINE-MPF) 1 % injection 0.5 mL, 0.5 mL, Infiltration, Once PRN    No Known Allergies    Objective     /83   Pulse 60   Temp 97.5 °F (36.4 °C) (Temporal)   Resp 16   SpO2 96%       PHYSICAL EXAM    Gen: NAD  Head: NCAT  CV: RRR  CHEST: Clear  ABD: soft, NT/ND  EXT: no edema      ASSESSMENT/PLAN:  This is a 67y.o. year old male here for colonoscopy, and he is stable and optimized for his procedure.

## 2023-08-17 NOTE — ANESTHESIA POSTPROCEDURE EVALUATION
Post-Op Assessment Note    CV Status:  Stable       Mental Status:  Sleepy   Hydration Status:  Stable   PONV Controlled:  Controlled   Airway Patency:  Patent      Post Op Vitals Reviewed: Yes      Staff: CRNA         No notable events documented.     BP   158/77   Temp      Pulse  62   Resp   20   SpO2   99

## 2023-08-17 NOTE — ANESTHESIA PREPROCEDURE EVALUATION
Procedure:  COLONOSCOPY    Relevant Problems   CARDIO   (+) Benign hypertension      NEURO/PSYCH   (+) Situational anxiety      PULMONARY   (+) Mixed simple and mucopurulent chronic bronchitis (HCC)      Other   (+) Nicotine abuse      Confirmed NPO appropriate. Denies recent fever or other symptom of upper respiratory tract infection beyond chronic cough from smoking. Has not seen physician in years. Stopped taking antihypertensive three years ago due to cost.    Physical Exam    Airway      TM Distance: >3 FB  Neck ROM: full     Dental   Comment: Edentulous,     Cardiovascular      Pulmonary      Other Findings        Anesthesia Plan  ASA Score- 2     Anesthesia Type- IV sedation with anesthesia with ASA Monitors. Additional Monitors:   Airway Plan:           Plan Factors-Exercise tolerance (METS): >4 METS. Exercise comment: Able to climb flight of stairs without cardiopulmonary limitation. Chart reviewed. Existing labs reviewed. Patient is a current smoker. Induction- intravenous. Postoperative Plan-     Informed Consent- Anesthetic plan and risks discussed with patient.

## 2023-08-19 PROCEDURE — 88305 TISSUE EXAM BY PATHOLOGIST: CPT | Performed by: PATHOLOGY

## 2024-11-05 ENCOUNTER — APPOINTMENT (OUTPATIENT)
Dept: RADIOLOGY | Facility: CLINIC | Age: 74
End: 2024-11-05
Payer: COMMERCIAL

## 2024-11-05 VITALS
HEART RATE: 80 BPM | BODY MASS INDEX: 24.48 KG/M2 | DIASTOLIC BLOOD PRESSURE: 80 MMHG | WEIGHT: 156 LBS | HEIGHT: 67 IN | SYSTOLIC BLOOD PRESSURE: 160 MMHG

## 2024-11-05 DIAGNOSIS — Z01.89 ENCOUNTER FOR LOWER EXTREMITY COMPARISON IMAGING STUDY: ICD-10-CM

## 2024-11-05 DIAGNOSIS — M25.461 EFFUSION OF RIGHT KNEE: Primary | ICD-10-CM

## 2024-11-05 DIAGNOSIS — M25.561 RIGHT KNEE PAIN, UNSPECIFIED CHRONICITY: ICD-10-CM

## 2024-11-05 LAB
APPEARANCE FLD: ABNORMAL
COLOR FLD: YELLOW
CRYSTALS SNV QL MICRO: NORMAL
MONOCYTES NFR SNV MANUAL: 6 %
NEUTROPHILS NFR SNV MANUAL: 94 %
SITE: ABNORMAL
TOTAL CELLS COUNTED SPEC: 100
WBC # FLD MANUAL: ABNORMAL /UL (ref 0–200)

## 2024-11-05 PROCEDURE — 20610 DRAIN/INJ JOINT/BURSA W/O US: CPT | Performed by: ORTHOPAEDIC SURGERY

## 2024-11-05 PROCEDURE — 89051 BODY FLUID CELL COUNT: CPT | Performed by: ORTHOPAEDIC SURGERY

## 2024-11-05 PROCEDURE — 87205 SMEAR GRAM STAIN: CPT | Performed by: ORTHOPAEDIC SURGERY

## 2024-11-05 PROCEDURE — 73560 X-RAY EXAM OF KNEE 1 OR 2: CPT

## 2024-11-05 PROCEDURE — 87476 LYME DIS DNA AMP PROBE: CPT | Performed by: ORTHOPAEDIC SURGERY

## 2024-11-05 PROCEDURE — 99204 OFFICE O/P NEW MOD 45 MIN: CPT | Performed by: ORTHOPAEDIC SURGERY

## 2024-11-05 PROCEDURE — 73562 X-RAY EXAM OF KNEE 3: CPT

## 2024-11-05 PROCEDURE — 87070 CULTURE OTHR SPECIMN AEROBIC: CPT | Performed by: ORTHOPAEDIC SURGERY

## 2024-11-05 PROCEDURE — 89060 EXAM SYNOVIAL FLUID CRYSTALS: CPT | Performed by: ORTHOPAEDIC SURGERY

## 2024-11-05 RX ADMIN — LIDOCAINE HYDROCHLORIDE 4 ML: 10 INJECTION, SOLUTION INFILTRATION; PERINEURAL at 14:15

## 2024-11-05 RX ADMIN — TRIAMCINOLONE ACETONIDE 40 MG: 40 INJECTION, SUSPENSION INTRA-ARTICULAR; INTRAMUSCULAR at 14:15

## 2024-11-05 NOTE — PROGRESS NOTES
"Assessment/Plan:  1. Effusion of right knee  XR knee 3 vw right non injury    XR knee 1 or 2 vw left    Lyme disease, PCR    Synovial fluid white cell count w/ diff    Synovial fluid, crystal    Body fluid culture and Gram stain    Lyme disease, PCR    Synovial fluid white cell count w/ diff    Synovial fluid, crystal    Large joint arthrocentesis: R knee    Synovial Fluid Diff      2. Encounter for lower extremity comparison imaging study  XR knee 3 vw right non injury    XR knee 1 or 2 vw left    Lyme disease, PCR    Synovial fluid white cell count w/ diff    Synovial fluid, crystal    Body fluid culture and Gram stain    Lyme disease, PCR    Synovial fluid white cell count w/ diff    Synovial fluid, crystal    Synovial Fluid Diff          Rich has right-sided knee pain consistent with a spontaneous knee effusion.  His x-rays failed to show any clear abnormality or severe degenerative changes.  We did proceed with a right knee aspiration today which he tolerated well.  The fluid was slightly cloudy but no signs of obvious infection.  We did send for fluid analysis to assess for Lyme versus gout causes.  I did proceed with a right knee cortisone injection which he tolerated well.  I will follow-up with him based on his fluid analysis results.  If his knee pain persists or worsens we will discuss other treatment options.      Large joint arthrocentesis: R knee  Universal Protocol:  Consent: Verbal consent obtained.  Consent given by: patient  Time out: Immediately prior to procedure a \"time out\" was called to verify the correct patient, procedure, equipment, support staff and site/side marked as required.  Site marked: the operative site was marked  Supporting Documentation  Indications: pain   Procedure Details  Location: knee - R knee  Preparation: Patient was prepped and draped in the usual sterile fashion  Needle size: 18 G  Ultrasound guidance: no  Approach: lateral  Medications administered: 4 mL lidocaine 1 " %; 40 mg triamcinolone acetonide 40 mg/mL    Aspirate amount: 34 mL  Aspirate: yellow and cloudy  Analysis: fluid sample sent for laboratory analysis  Patient tolerance: patient tolerated the procedure well with no immediate complications  Dressing:  Sterile dressing applied            Subjective:   Karlo Witt is a 74 y.o. male who presents to the office for evaluation for right-sided knee pain.  He denies any injury or trauma.  He has been feeling increased discomfort in the right knee for about 1 week and notices a lot of fluid and swelling and difficulty walking.  He denies any history of knee issue in the past.  He has bumped this in the past and that has swelled up but never this severe.  He may have bumped it again causing today's swelling but does not recall any specific twist or pop within the knee.  He feels like it slightly warm.  He has been icing and using a brace.      Review of Systems   Constitutional:  Negative for chills, fever and unexpected weight change.   HENT:  Negative for hearing loss, nosebleeds and sore throat.    Eyes:  Negative for pain, redness and visual disturbance.   Respiratory:  Negative for cough, shortness of breath and wheezing.    Cardiovascular:  Negative for chest pain, palpitations and leg swelling.   Gastrointestinal:  Negative for abdominal pain, nausea and vomiting.   Endocrine: Negative for polyphagia and polyuria.   Genitourinary:  Negative for dysuria and hematuria.   Musculoskeletal:         See HPI   Skin:  Negative for rash and wound.   Neurological:  Negative for dizziness, numbness and headaches.   Psychiatric/Behavioral:  Negative for decreased concentration and suicidal ideas. The patient is not nervous/anxious.          Past Medical History:   Diagnosis Date    ED (erectile dysfunction)     Hypertension     Inguinal hernia, bilateral     resolved 5/28/15    Shortness of breath     exertional       Past Surgical History:   Procedure Laterality Date     INGUINAL HERNIA REPAIR Bilateral     direct    TONSILLECTOMY      over age 11       Family History   Problem Relation Age of Onset    Alzheimer's disease Mother     Cancer Father         Prostate    Prostate cancer Father     Diabetes Family        Social History     Occupational History    Not on file   Tobacco Use    Smoking status: Every Day     Types: Cigars     Passive exposure: Past    Smokeless tobacco: Never    Tobacco comments:     Pack of cigars smoker   Vaping Use    Vaping status: Never Used   Substance and Sexual Activity    Alcohol use: Yes     Alcohol/week: 7.0 standard drinks of alcohol     Types: 7 Cans of beer per week     Comment: few beers a day 4-6    Drug use: Yes     Types: Marijuana     Comment: occ    Sexual activity: Not on file         Current Outpatient Medications:     BIOTIN PO, Take by mouth, Disp: , Rfl:     MAGNESIUM PO, Take by mouth, Disp: , Rfl:     No Known Allergies    Objective:  Vitals:    11/05/24 1416   BP: 160/80   Pulse: 80            Right Knee Exam     Tenderness   The patient is experiencing tenderness in the medial joint line and lateral joint line.    Range of Motion   Extension:  normal   Flexion:  90 abnormal     Other   Erythema: absent  Sensation: normal  Pulse: present  Swelling: moderate  Effusion: effusion present          Observations     Right Knee   Positive for effusion.       Physical Exam  Vitals reviewed.   Constitutional:       Appearance: He is well-developed.   HENT:      Head: Normocephalic and atraumatic.   Eyes:      Conjunctiva/sclera: Conjunctivae normal.      Pupils: Pupils are equal, round, and reactive to light.   Cardiovascular:      Rate and Rhythm: Normal rate.      Pulses: Normal pulses.   Pulmonary:      Effort: Pulmonary effort is normal. No respiratory distress.   Musculoskeletal:      Cervical back: Normal range of motion and neck supple.      Right knee: Effusion present.      Comments: As noted in HPI   Skin:     General: Skin is  warm and dry.   Neurological:      General: No focal deficit present.      Mental Status: He is alert and oriented to person, place, and time.   Psychiatric:         Mood and Affect: Mood normal.         Behavior: Behavior normal.         I have personally reviewed pertinent films in PACS and my interpretation is as follows:  Right knee x-ray demonstrates no evidence of acute fracture.  Moderate knee effusion visualized on lateral view.  No significant degenerative changes.      This document was created using speech voice recognition software.   Grammatical errors, random word insertions, pronoun errors, and incomplete sentences are an occasional consequence of this system due to software limitations, ambient noise, and hardware issues.   Any formal questions or concerns about content, text, or information contained within the body of this dictation should be directly addressed to the provider for clarification.

## 2024-11-06 RX ORDER — TRIAMCINOLONE ACETONIDE 40 MG/ML
40 INJECTION, SUSPENSION INTRA-ARTICULAR; INTRAMUSCULAR
Status: COMPLETED | OUTPATIENT
Start: 2024-11-05 | End: 2024-11-05

## 2024-11-06 RX ORDER — LIDOCAINE HYDROCHLORIDE 10 MG/ML
4 INJECTION, SOLUTION INFILTRATION; PERINEURAL
Status: COMPLETED | OUTPATIENT
Start: 2024-11-05 | End: 2024-11-05

## 2024-11-08 LAB
BACTERIA SPEC BFLD CULT: NO GROWTH
GRAM STN SPEC: NORMAL

## 2024-11-12 ENCOUNTER — TELEPHONE (OUTPATIENT)
Age: 74
End: 2024-11-12

## 2024-11-12 NOTE — TELEPHONE ENCOUNTER
Caller: Spouse/Mayte    Doctor: Jacobo    Reason for call: Questioned results of fluid removed from knee. Please cb to advise    Call back#: [phone]

## 2024-11-13 NOTE — TELEPHONE ENCOUNTER
Caller: Patient     Doctor: Jacobo     Reason for call: Missed a call     Call back#: [phone]

## 2024-11-14 ENCOUNTER — RESULTS FOLLOW-UP (OUTPATIENT)
Dept: OBGYN CLINIC | Facility: CLINIC | Age: 74
End: 2024-11-14

## 2024-11-14 DIAGNOSIS — A69.23 LYME ARTHRITIS OF KNEE (HCC): Primary | ICD-10-CM

## 2024-11-14 LAB — B BURGDOR DNA SPEC QL NAA+PROBE: POSITIVE

## 2024-11-14 RX ORDER — DOXYCYCLINE 100 MG/1
100 CAPSULE ORAL 2 TIMES DAILY
Qty: 56 CAPSULE | Refills: 0 | Status: SHIPPED | OUTPATIENT
Start: 2024-11-14 | End: 2024-12-12

## 2024-11-14 NOTE — TELEPHONE ENCOUNTER
Patient returning call to Brain.  He can be reached at the number below.      Call back:  [phone]

## 2024-11-14 NOTE — TELEPHONE ENCOUNTER
LVM TO CB, PLEASE TRANSFER TO ME   ----- Message from Fareed Centeno DO sent at 11/14/2024  1:23 PM EST -----  Rich's fluid aspirate results finally came back positive for Lyme disease.  This is consistent with his pain and steroids often help this improve following the injection.  The best treatment is to place him on oral antibiotics for 1 month to help eliminate the disseminated Lyme and prevent this from coming back.  I will send the prescription to his pharmacy.  Although it is November, please remind him to be careful about prolonged sun exposure as this can increase risk of sunburn.  Wear long sleeves and hats if needed.  Always take this with food and tall glass of water.